# Patient Record
Sex: MALE
[De-identification: names, ages, dates, MRNs, and addresses within clinical notes are randomized per-mention and may not be internally consistent; named-entity substitution may affect disease eponyms.]

---

## 2021-02-09 PROBLEM — Z00.00 ENCOUNTER FOR PREVENTIVE HEALTH EXAMINATION: Status: ACTIVE | Noted: 2021-02-09

## 2021-02-12 ENCOUNTER — APPOINTMENT (OUTPATIENT)
Dept: COLORECTAL SURGERY | Facility: CLINIC | Age: 31
End: 2021-02-12
Payer: COMMERCIAL

## 2021-02-12 VITALS
HEIGHT: 71 IN | HEART RATE: 96 BPM | OXYGEN SATURATION: 99 % | TEMPERATURE: 98.1 F | SYSTOLIC BLOOD PRESSURE: 115 MMHG | BODY MASS INDEX: 22.48 KG/M2 | WEIGHT: 160.56 LBS | DIASTOLIC BLOOD PRESSURE: 59 MMHG

## 2021-02-12 DIAGNOSIS — Z80.0 FAMILY HISTORY OF MALIGNANT NEOPLASM OF DIGESTIVE ORGANS: ICD-10-CM

## 2021-02-12 DIAGNOSIS — Z86.19 PERSONAL HISTORY OF OTHER INFECTIOUS AND PARASITIC DISEASES: ICD-10-CM

## 2021-02-12 DIAGNOSIS — K62.5 HEMORRHAGE OF ANUS AND RECTUM: ICD-10-CM

## 2021-02-12 DIAGNOSIS — K62.9 DISEASE OF ANUS AND RECTUM, UNSPECIFIED: ICD-10-CM

## 2021-02-12 PROCEDURE — 99204 OFFICE O/P NEW MOD 45 MIN: CPT | Mod: 25

## 2021-02-12 PROCEDURE — 46600 DIAGNOSTIC ANOSCOPY SPX: CPT

## 2021-02-12 PROCEDURE — 99072 ADDL SUPL MATRL&STAF TM PHE: CPT

## 2021-02-12 RX ORDER — DEXTROAMPHETAMINE SACCHARATE, AMPHETAMINE ASPARTATE, DEXTROAMPHETAMINE SULFATE, AND AMPHETAMINE SULFATE 3.75; 3.75; 3.75; 3.75 MG/1; MG/1; MG/1; MG/1
TABLET ORAL
Refills: 0 | Status: ACTIVE | COMMUNITY

## 2021-02-12 RX ORDER — EMTRICITABINE AND TENOFOVIR DISOPROXIL FUMARATE 200; 300 MG/1; MG/1
200-300 TABLET, FILM COATED ORAL
Refills: 0 | Status: ACTIVE | COMMUNITY

## 2021-02-12 NOTE — HISTORY OF PRESENT ILLNESS
[FreeTextEntry1] : 30 year old  bravo male complaining of anal discomfort progressively worsening over the last 6 months. HIV negative, on Truvada for prep.  judi lau Patient states he underwent a CT scan of the abdomen and pelvis and was told that the anal pain is related to pain he has been having in his back and also in his testicles. judi lau Bowel movements are generally daily and has a habit of sitting and staying in the bathroom.  .  Notices  that he has pain after using the bathroom,   feels that his testicles are very tight and also has back pain. 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  No recent infections to his knowledge\aj lau Has no nausea or vomiting. No fevers,  Has blood in his stool since was diagnosed with inflammation during the colonoscopy in 2018. judi lau Had colonoscopy done in 2018 where he was diagnosed with proctitis in distal rectum only.  Never treated.  Resolved on its own.   Had CT done recently for back and general pain that revealed sigmoid and rectal wall thickening c/w colitis, IBD or proctiitis. Sent to us because of that CT scan. judi lau Has had a history of gonorrhea and syphilis. judi lau

## 2021-02-12 NOTE — PHYSICAL EXAM
[Reduce Spontaneously] : a spontaneously reducible (grade II) [Skin Tags] : residual hemorrhoidal skin tags were noted [Normal] : was normal [None] : there was no rectal mass  [Normal Breath Sounds] : Normal breath sounds [Normal Heart Sounds] : normal heart sounds [2+] : left 2+ [No Rash or Lesion] : No rash or lesion [Alert] : alert [Oriented to Person] : oriented to person [Oriented to Place] : oriented to place [Oriented to Time] : oriented to time [Calm] : calm [Tender, Swollen] : nontender, non-swollen [Thrombosed] : that was not thrombosed [Stool Sample Taken] : no stool obtained on rectal exam [Gross Blood] : no gross blood [JVD] : no jugular venous distention  [Thyroid] : the thyroid was abnormal [Carotid Bruits] : no carotid bruits [de-identified] : benign, non tender [de-identified] : complex anus with few big tags and other smaller ones. [de-identified] : digital: tone WNL, no large masses, hemorrhoids palpable [de-identified] : anoscopy, adult, 3 passes; highly suspect small anal warts at or near dentate in 4 locations at least, distal rectal mucosal looks fine but edematous [de-identified] : few larger other small [de-identified] : nad

## 2021-02-12 NOTE — ASSESSMENT
[FreeTextEntry1] : Hx STD's and warts.  I see 4-5 suspicous small lesions c/w anal warts.  Needs EUA and colonoscopy to evaluate for colitis/proctiitis and to look for bleeding source. \par Will do both in OR to expedite the w/u\par Hx proctitis in 2018.\par Has rectal bleeding periodically as well.\par Risks and benefits explained.\par \par

## 2023-02-23 ENCOUNTER — NON-APPOINTMENT (OUTPATIENT)
Age: 33
End: 2023-02-23